# Patient Record
(demographics unavailable — no encounter records)

---

## 2025-04-23 NOTE — ASSESSMENT
[FreeTextEntry1] : For prostate cancer screening PSA will be obtained. Urine studies are also obtained. He will be informed of the results.

## 2025-04-23 NOTE — HISTORY OF PRESENT ILLNESS
[FreeTextEntry1] : This 72 year old male presents to establish care. He reports he has history of BPH with lower tract symptoms he is status post green light laser about 7 years ago and his urinary symptoms are well controlled. He is not on any medications for BPH. He also notes he had a prostate biopsy which showed one area of suspicion but nothing further was done. His post void residual is noted to be 0 cc.

## 2025-04-23 NOTE — PHYSICAL EXAM
[Normal Appearance] : normal appearance [Well Groomed] : well groomed [General Appearance - In No Acute Distress] : no acute distress [Edema] : no peripheral edema [Respiration, Rhythm And Depth] : normal respiratory rhythm and effort [Exaggerated Use Of Accessory Muscles For Inspiration] : no accessory muscle use [Abdomen Soft] : soft [Abdomen Tenderness] : non-tender [Costovertebral Angle Tenderness] : no ~M costovertebral angle tenderness [Urethral Meatus] : meatus normal [Urinary Bladder Findings] : the bladder was normal on palpation [Testes Tenderness] : no tenderness of the testes [Testes Mass (___cm)] : there were no testicular masses [Prostate Tenderness] : the prostate was not tender [No Prostate Nodules] : no prostate nodules [Normal Station and Gait] : the gait and station were normal for the patient's age [] : no rash [No Focal Deficits] : no focal deficits [Oriented To Time, Place, And Person] : oriented to person, place, and time [Affect] : the affect was normal [Mood] : the mood was normal [No Palpable Adenopathy] : no palpable adenopathy [Chaperone Present] : A chaperone was present in the examining room during all aspects of the physical examination [FreeTextEntry2] : JUAN PABLO Willett

## 2025-05-29 NOTE — ASSESSMENT
[FreeTextEntry1] : 72 year old M with HTN, HLD, gout who presents for cardiac evaluation of abnormal EKG.  1) Abnormal EKG - EKG 5/24/25 showed sinus rhythm with possible anteroseptal infarct - Pt is currently suffering from L foot pain from gout attack. I advised pt to undergo treadmill stress test to r/o ischemia once he is recovered - Given possible anteroseptal infarct on EKG, I advised pt to undergo TTE to r/o structural heart disease and wall motion abnormalities  2) HTN - Elevated, likely in setting of multiple NSAIDs. I advised pt to not take Celebrex and Diclofenac together given possible bleeding and blood pressure issues - Pt states he has colchicine at home which I suggested he try. I advised pt to f/u PCP and rheumatology - If BP is still elevated once gout attack resolves, I will consider increasing losartan and/or metoprolol  3) HLD - Continue atorvastatin 40mg daily for now (just started few days ago)  4) Follow-up, 1 month pending echo and stress testing

## 2025-05-29 NOTE — HISTORY OF PRESENT ILLNESS
[FreeTextEntry1] : 72 year old M with HTN, HLD, gout who presents for cardiac evaluation of abnormal EKG.  Meds: Atorvastatin 40mg daily, started 3-4 days ago metoprolol tartrate 25mg BID, Losartan 50mg daily Allopurinol, Diclofenac 50, Celecoxib (past 1-2 weeks), Prednisone 20mg (stopped)  Pt was seen by Dr. Gandara a few days ago, was told to come to cardiology for general check-up due to HLD and HTN. He has been suffering from L foot and L hand pain due to gout attacks. He saw a rheumatologist who started Diclofenac. He is also on Celecoxib by PCP. He notes higher blood pressures recently. Currently he has difficulty walking fast due to L foot pain. At baseline, he denies CP, SOB, palpitation, dizziness, or LOC. He plays basketball occasionally and tries to walk on treadmill without CP or SOB.